# Patient Record
Sex: FEMALE | Race: WHITE | NOT HISPANIC OR LATINO | Employment: UNEMPLOYED | URBAN - METROPOLITAN AREA
[De-identification: names, ages, dates, MRNs, and addresses within clinical notes are randomized per-mention and may not be internally consistent; named-entity substitution may affect disease eponyms.]

---

## 2019-08-12 ENCOUNTER — APPOINTMENT (OUTPATIENT)
Dept: CT IMAGING | Facility: HOSPITAL | Age: 24
End: 2019-08-12

## 2019-08-12 ENCOUNTER — HOSPITAL ENCOUNTER (EMERGENCY)
Facility: HOSPITAL | Age: 24
Discharge: HOME | End: 2019-08-12
Admitting: EMERGENCY MEDICINE

## 2019-08-12 VITALS
DIASTOLIC BLOOD PRESSURE: 67 MMHG | HEIGHT: 67 IN | HEART RATE: 74 BPM | TEMPERATURE: 98.7 F | OXYGEN SATURATION: 99 % | BODY MASS INDEX: 24.26 KG/M2 | WEIGHT: 154.54 LBS | RESPIRATION RATE: 18 BRPM | SYSTOLIC BLOOD PRESSURE: 108 MMHG

## 2019-08-12 DIAGNOSIS — R10.9 RIGHT FLANK PAIN: Primary | ICD-10-CM

## 2019-08-12 DIAGNOSIS — N39.0 ACUTE UTI: ICD-10-CM

## 2019-08-12 LAB
ALBUMIN SERPL-MCNC: 4 G/DL (ref 3.5–4.8)
ALBUMIN/GLOB SERPL: 1.6 G/DL (ref 1–1.7)
ALP SERPL-CCNC: 61 U/L (ref 32–91)
ALT SERPL W P-5'-P-CCNC: 37 U/L (ref 14–54)
ANION GAP SERPL CALCULATED.3IONS-SCNC: 11.8 MMOL/L (ref 5–15)
AST SERPL-CCNC: 23 U/L (ref 15–41)
B-HCG UR QL: NEGATIVE
BACTERIA UR QL AUTO: ABNORMAL /HPF
BASOPHILS # BLD AUTO: 0 10*3/MM3 (ref 0–0.2)
BASOPHILS NFR BLD AUTO: 0.4 % (ref 0–1.5)
BILIRUB SERPL-MCNC: 0.5 MG/DL (ref 0.3–1.2)
BILIRUB UR QL STRIP: NEGATIVE
BUN BLD-MCNC: 12 MG/DL (ref 8–20)
BUN/CREAT SERPL: 17.1 (ref 5.4–26.2)
CALCIUM SPEC-SCNC: 8.9 MG/DL (ref 8.9–10.3)
CHLORIDE SERPL-SCNC: 111 MMOL/L (ref 101–111)
CLARITY UR: ABNORMAL
CO2 SERPL-SCNC: 22 MMOL/L (ref 22–32)
COLOR UR: YELLOW
CREAT BLD-MCNC: 0.7 MG/DL (ref 0.4–1)
DEPRECATED RDW RBC AUTO: 40.3 FL (ref 37–54)
EOSINOPHIL # BLD AUTO: 0.2 10*3/MM3 (ref 0–0.4)
EOSINOPHIL NFR BLD AUTO: 3.9 % (ref 0.3–6.2)
ERYTHROCYTE [DISTWIDTH] IN BLOOD BY AUTOMATED COUNT: 13 % (ref 12.3–15.4)
GFR SERPL CREATININE-BSD FRML MDRD: 102 ML/MIN/1.73
GLOBULIN UR ELPH-MCNC: 2.5 GM/DL (ref 2.5–3.8)
GLUCOSE BLD-MCNC: 89 MG/DL (ref 65–99)
GLUCOSE UR STRIP-MCNC: NEGATIVE MG/DL
HCT VFR BLD AUTO: 40.4 % (ref 34–46.6)
HGB BLD-MCNC: 13.4 G/DL (ref 12–15.9)
HGB UR QL STRIP.AUTO: NEGATIVE
HYALINE CASTS UR QL AUTO: ABNORMAL /LPF
KETONES UR QL STRIP: NEGATIVE
LEUKOCYTE ESTERASE UR QL STRIP.AUTO: ABNORMAL
LYMPHOCYTES # BLD AUTO: 1.3 10*3/MM3 (ref 0.7–3.1)
LYMPHOCYTES NFR BLD AUTO: 23.6 % (ref 19.6–45.3)
MCH RBC QN AUTO: 29.4 PG (ref 26.6–33)
MCHC RBC AUTO-ENTMCNC: 33.1 G/DL (ref 31.5–35.7)
MCV RBC AUTO: 88.9 FL (ref 79–97)
MONOCYTES # BLD AUTO: 0.4 10*3/MM3 (ref 0.1–0.9)
MONOCYTES NFR BLD AUTO: 7.5 % (ref 5–12)
NEUTROPHILS # BLD AUTO: 3.5 10*3/MM3 (ref 1.7–7)
NEUTROPHILS NFR BLD AUTO: 64.6 % (ref 42.7–76)
NITRITE UR QL STRIP: POSITIVE
NRBC BLD AUTO-RTO: 0 /100 WBC (ref 0–0.2)
PH UR STRIP.AUTO: 7 [PH] (ref 5–8)
PLATELET # BLD AUTO: 166 10*3/MM3 (ref 140–450)
PMV BLD AUTO: 9.2 FL (ref 6–12)
POTASSIUM BLD-SCNC: 3.8 MMOL/L (ref 3.6–5.1)
PROT SERPL-MCNC: 6.5 G/DL (ref 6.1–7.9)
PROT UR QL STRIP: NEGATIVE
RBC # BLD AUTO: 4.55 10*6/MM3 (ref 3.77–5.28)
RBC # UR: ABNORMAL /HPF
REF LAB TEST METHOD: ABNORMAL
SODIUM BLD-SCNC: 141 MMOL/L (ref 136–144)
SP GR UR STRIP: 1.02 (ref 1–1.03)
SQUAMOUS #/AREA URNS HPF: ABNORMAL /HPF
UROBILINOGEN UR QL STRIP: ABNORMAL
WBC NRBC COR # BLD: 5.4 10*3/MM3 (ref 3.4–10.8)
WBC UR QL AUTO: ABNORMAL /HPF

## 2019-08-12 PROCEDURE — 96375 TX/PRO/DX INJ NEW DRUG ADDON: CPT

## 2019-08-12 PROCEDURE — 25010000002 KETOROLAC TROMETHAMINE PER 15 MG: Performed by: NURSE PRACTITIONER

## 2019-08-12 PROCEDURE — 87086 URINE CULTURE/COLONY COUNT: CPT | Performed by: NURSE PRACTITIONER

## 2019-08-12 PROCEDURE — 81001 URINALYSIS AUTO W/SCOPE: CPT | Performed by: NURSE PRACTITIONER

## 2019-08-12 PROCEDURE — 74176 CT ABD & PELVIS W/O CONTRAST: CPT

## 2019-08-12 PROCEDURE — 80053 COMPREHEN METABOLIC PANEL: CPT | Performed by: NURSE PRACTITIONER

## 2019-08-12 PROCEDURE — 99283 EMERGENCY DEPT VISIT LOW MDM: CPT

## 2019-08-12 PROCEDURE — 81025 URINE PREGNANCY TEST: CPT | Performed by: NURSE PRACTITIONER

## 2019-08-12 PROCEDURE — 87186 SC STD MICRODIL/AGAR DIL: CPT | Performed by: NURSE PRACTITIONER

## 2019-08-12 PROCEDURE — 96374 THER/PROPH/DIAG INJ IV PUSH: CPT

## 2019-08-12 PROCEDURE — 25010000002 CEFTRIAXONE PER 250 MG: Performed by: NURSE PRACTITIONER

## 2019-08-12 PROCEDURE — 85025 COMPLETE CBC W/AUTO DIFF WBC: CPT | Performed by: NURSE PRACTITIONER

## 2019-08-12 PROCEDURE — 87088 URINE BACTERIA CULTURE: CPT | Performed by: NURSE PRACTITIONER

## 2019-08-12 RX ORDER — CEFDINIR 300 MG/1
300 CAPSULE ORAL 2 TIMES DAILY
Qty: 20 CAPSULE | Refills: 0 | Status: SHIPPED | OUTPATIENT
Start: 2019-08-12 | End: 2019-08-12 | Stop reason: SDUPTHER

## 2019-08-12 RX ORDER — SODIUM CHLORIDE 0.9 % (FLUSH) 0.9 %
10 SYRINGE (ML) INJECTION AS NEEDED
Status: DISCONTINUED | OUTPATIENT
Start: 2019-08-12 | End: 2019-08-12 | Stop reason: HOSPADM

## 2019-08-12 RX ORDER — KETOROLAC TROMETHAMINE 30 MG/ML
30 INJECTION, SOLUTION INTRAMUSCULAR; INTRAVENOUS ONCE
Status: COMPLETED | OUTPATIENT
Start: 2019-08-12 | End: 2019-08-12

## 2019-08-12 RX ORDER — CEFDINIR 300 MG/1
300 CAPSULE ORAL 2 TIMES DAILY
Qty: 14 CAPSULE | Refills: 0 | Status: SHIPPED | OUTPATIENT
Start: 2019-08-12 | End: 2019-08-19

## 2019-08-12 RX ADMIN — CEFTRIAXONE SODIUM 1 G: 10 INJECTION, POWDER, FOR SOLUTION INTRAVENOUS at 10:55

## 2019-08-12 RX ADMIN — KETOROLAC TROMETHAMINE 30 MG: 30 INJECTION, SOLUTION INTRAMUSCULAR at 09:29

## 2019-08-12 RX ADMIN — SODIUM CHLORIDE 500 ML: 900 INJECTION, SOLUTION INTRAVENOUS at 09:29

## 2019-08-12 NOTE — DISCHARGE INSTRUCTIONS
Take antibiotics as prescribed.  Drink plenty of fluids.  Tylenol ibuprofen as needed for pain.  Follow-up with primary care physician.  Return for new or worsening symptoms.

## 2019-08-12 NOTE — ED PROVIDER NOTES
Subjective   Patient is a 24-year-old white female with no significant medical history presents today with complaints of right flank pain.  She states her symptoms started yesterday and become more constant and severe.  She states the pain does radiate into her right lower back.  She denies any radiation into her abdomen.  She denies any dysuria frequency or urgency but states her urine has smelled stronger than usual.  She denies any visible hematuria.  She denies any nausea vomiting diarrhea or constipation.  She denies fever or chills.  Denies any vaginal bleeding or discharge.            Review of Systems   Constitutional: Negative for chills and fever.   Gastrointestinal: Negative for abdominal pain, diarrhea, nausea and vomiting.   Genitourinary: Positive for flank pain. Negative for decreased urine volume, difficulty urinating, dysuria, frequency, hematuria, pelvic pain, urgency, vaginal bleeding and vaginal discharge.   Musculoskeletal: Positive for back pain.   Skin: Negative for rash.       History reviewed. No pertinent past medical history.    Allergies   Allergen Reactions   • Codeine Unknown (See Comments)     Unknown     • Sulfa Antibiotics Unknown (See Comments)     Unknown         Past Surgical History:   Procedure Laterality Date   • APPENDECTOMY         History reviewed. No pertinent family history.    Social History     Socioeconomic History   • Marital status: Single     Spouse name: Not on file   • Number of children: Not on file   • Years of education: Not on file   • Highest education level: Not on file   Tobacco Use   • Smoking status: Never Smoker   Substance and Sexual Activity   • Alcohol use: No     Frequency: Never   • Drug use: No           Objective   Physical Exam   Constitutional: She appears well-developed.   Vital signs and triage nurse note reviewed.  Constitutional: Awake, alert; well-developed and well-nourished. No acute distress is noted.  HEENT: Normocephalic, atraumatic;  pupils are PERRL with intact EOM; oropharynx is pink and moist without exudate or erythema.  No drooling or pooling of oral secretions.  Neck: Supple, full range of motion without pain; no cervical lymphadenopathy. Normal phonation.  Cardiovascular: Regular rate and rhythm, normal S1-S2.  No murmur noted.  Pulmonary: Respiratory effort regular nonlabored, breath sounds clear to auscultation all fields.  Abdomen: Soft, nontender, nondistended with normoactive bowel sounds; no rebound or guarding.  Mildly tender right flank.  No CVAT.  Musculoskeletal: Independent range of motion of all extremities with no palpable tenderness or edema.  Neuro: Alert oriented x3, speech is clear and appropriate, GCS 15.    Skin: Flesh tone, warm, dry, intact; no erythematous or petechial rash or lesion.        Procedures           ED Course      Labs Reviewed   URINALYSIS W/ CULTURE IF INDICATED - Abnormal; Notable for the following components:       Result Value    Appearance, UA Cloudy (*)     Leuk Esterase, UA Moderate (2+) (*)     Nitrite, UA Positive (*)     All other components within normal limits   URINALYSIS, MICROSCOPIC ONLY - Abnormal; Notable for the following components:    RBC, UA 0-2 (*)     WBC, UA 21-30 (*)     Bacteria, UA 3+ (*)     Squamous Epithelial Cells, UA 7-12 (*)     All other components within normal limits   COMPREHENSIVE METABOLIC PANEL - Normal   PREGNANCY, URINE - Normal   CBC WITH AUTO DIFFERENTIAL - Normal   URINE CULTURE   CBC AND DIFFERENTIAL    Narrative:     The following orders were created for panel order CBC & Differential.  Procedure                               Abnormality         Status                     ---------                               -----------         ------                     CBC Auto Differential[[id number]]        Normal              Final result                 Please view results for these tests on the individual orders.     Ct Abdomen Pelvis Without Contrast    Result  Date: 8/12/2019  1. No acute intra-abdominal or pelvic abnormality. 2. Small volume free fluid within the pelvis likely physiologic given patient's age.    Electronically Signed By-Juan Bee On:8/12/2019 10:37 AM This report was finalized on [id number] by  Juan Bee, .    Medications   sodium chloride 0.9 % flush 10 mL (not administered)   cefTRIAXone (ROCEPHIN) in SWFI 1 gram/10ml IV PUSH syringe (not administered)   sodium chloride 0.9 % bolus 500 mL (500 mL Intravenous New Bag 8/12/19 0929)   ketorolac (TORADOL) injection 30 mg (30 mg Intravenous Given 8/12/19 0929)                 MDM  Number of Diagnoses or Management Options     Amount and/or Complexity of Data Reviewed  Clinical lab tests: ordered and reviewed  Tests in the radiology section of CPT®: ordered and reviewed  Independent visualization of images, tracings, or specimens: yes    Risk of Complications, Morbidity, and/or Mortality  General comments: Comorbidities: None  Differentials: UTI, pyelonephritis, kidney stone;this list is not all inclusive and does not constitute the entirety of considered causes    Patient had IV established.  She had labs and CT abdomen pelvis obtained.  She is given a small fluid bolus as well as Toradol for pain.    On reexamination, patient's resting comfortably in no acute distress.  Denies any new complaints at this time.  She remains well-appearing.  Her vital signs are stable.    Patient is given Rocephin 1 g IV for urinary tract infection.    Diagnosis and treatment plan discussed with patient.  Patient agreeable to plan.   I discussed findings with patient who voices understanding of discharge instructions, signs and symptoms requiring return to ED; discharged improved and in stable condition with follow up for re-evaluation.  Prescription for Cefdinir.    Patient Progress  Patient progress: stable        Final diagnoses:   Right flank pain   Acute UTI            Rashmi Villatoro, JEAN-PAUL  08/12/19  1042

## 2019-08-12 NOTE — ED NOTES
Pt states that she has abd pain since yesterday. Pt is having right sided pain.     Dannie Gibbons, LPN  08/12/19 0996

## 2019-08-14 LAB — BACTERIA SPEC AEROBE CULT: ABNORMAL

## 2019-08-14 NOTE — PROGRESS NOTES
Notes recorded by Waldo Moore, PharmD on 8/14/2019 at 4:11 PM EDT  Final C/S resulted. Cefdinir appropriate for treatment. No follow up therapy intervention needed.   Waldo Moore, PharmD    ------    Notes recorded by Iron Arboleda, PharmD on 8/13/2019 at 1:56 PM EDT  8/12 urine cx growing 100K E.coli, Cefdinir given at ED d/c. Pending final C&S.  Iron Arboleda, DeannaD

## 2020-07-25 ENCOUNTER — HOSPITAL ENCOUNTER (EMERGENCY)
Facility: HOSPITAL | Age: 25
Discharge: HOME | End: 2020-07-25
Admitting: EMERGENCY MEDICINE

## 2020-07-25 VITALS
TEMPERATURE: 98 F | RESPIRATION RATE: 16 BRPM | OXYGEN SATURATION: 98 % | HEIGHT: 67 IN | HEART RATE: 70 BPM | BODY MASS INDEX: 26.33 KG/M2 | WEIGHT: 167.77 LBS | SYSTOLIC BLOOD PRESSURE: 126 MMHG | DIASTOLIC BLOOD PRESSURE: 84 MMHG

## 2020-07-25 DIAGNOSIS — Z71.1 WORRIED WELL: Primary | ICD-10-CM
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
## 2020-07-25 PROCEDURE — 99282 EMERGENCY DEPT VISIT SF MDM: CPT

## 2020-07-26 NOTE — DISCHARGE INSTRUCTIONS
Monitor for symptoms.  Follow-up with your OB/GYN as needed.  Return for pain, bleeding, new or worsening symptoms.

## 2020-07-26 NOTE — ED PROVIDER NOTES
"Subjective   Patient is a 25-year-old white female with no significant medical history who presents today with a worried well visit.  She states she was running to get to her toddler who was outside.  She states she tripped and fell and landed flat on her stomach.  She states she was able to get up after the fall.  She states she had a laparoscopic tubal ligation performed yesterday and given history of trauma today is worried.  She denies any abdominal pain.  She denies any vaginal bleeding or discharge.  She denies any fever.  She denies striking her head or having LOC.  She denies any other injury or complaint.  She states \"I just wanted to get it checked out.\"          Review of Systems   Constitutional: Negative for fever.   Respiratory: Negative for shortness of breath.    Cardiovascular: Negative for chest pain.   Gastrointestinal: Negative for abdominal pain, diarrhea, nausea and vomiting.   Genitourinary: Negative for pelvic pain, vaginal bleeding and vaginal discharge.       History reviewed. No pertinent past medical history.    Allergies   Allergen Reactions   • Codeine Unknown (See Comments)     Unknown     • Sulfa Antibiotics Unknown (See Comments)     Unknown         Past Surgical History:   Procedure Laterality Date   • APPENDECTOMY         History reviewed. No pertinent family history.    Social History     Socioeconomic History   • Marital status: Single     Spouse name: Not on file   • Number of children: Not on file   • Years of education: Not on file   • Highest education level: Not on file   Tobacco Use   • Smoking status: Never Smoker   Substance and Sexual Activity   • Alcohol use: No     Frequency: Never   • Drug use: No   • Sexual activity: Defer           Objective   Physical Exam  Vital signs and triage nurse note reviewed.  Constitutional: Awake, alert; well-developed and well-nourished. No acute distress is noted.  HEENT: Normocephalic, atraumatic; pupils are PERRL with intact EOM; " oropharynx is pink and moist without exudate or erythema.  No drooling or pooling of oral secretions.  Neck: Supple, full range of motion without pain; no cervical lymphadenopathy. Normal phonation.  Cardiovascular: Regular rate and rhythm, normal S1-S2.  No murmur noted.  Pulmonary: Respiratory effort regular nonlabored, breath sounds clear to auscultation all fields.  Abdomen: Soft, nontender, nondistended with normoactive bowel sounds; no rebound or guarding.  There are well approximated small surgical incisions noted over the abdomen.  There is no signs of infection.  No erythema.  No ecchymosis.  No drainage from these areas.  Musculoskeletal: Independent range of motion of all extremities with no palpable tenderness or edema.  Neuro: Alert oriented x3, speech is clear and appropriate, GCS 15.    Skin: Flesh tone, warm, dry, intact; no erythematous or petechial rash or lesion.      Procedures           ED Course                                           MDM  Number of Diagnoses or Management Options  Worried well:   Diagnosis management comments: Diagnosis and treatment plan discussed with patient.  Patient agreeable to plan.   I discussed findings with patient who voices understanding of discharge instructions, signs and symptoms requiring return to ED; discharged improved and in stable condition with follow up for re-evaluation.      Patient Progress  Patient progress: stable      Final diagnoses:   Worried well   Fall, initial encounter            Rashmi Villatoro APRN  07/25/20 2300       Rashmi Villatoro APRN  07/25/20 2300

## 2020-11-09 ENCOUNTER — HOSPITAL ENCOUNTER (EMERGENCY)
Facility: HOSPITAL | Age: 25
Discharge: HOME | End: 2020-11-09
Attending: EMERGENCY MEDICINE | Admitting: EMERGENCY MEDICINE

## 2020-11-09 VITALS
WEIGHT: 158.73 LBS | HEART RATE: 78 BPM | DIASTOLIC BLOOD PRESSURE: 87 MMHG | SYSTOLIC BLOOD PRESSURE: 124 MMHG | HEIGHT: 67 IN | BODY MASS INDEX: 24.91 KG/M2 | OXYGEN SATURATION: 100 % | TEMPERATURE: 98.7 F | RESPIRATION RATE: 18 BRPM

## 2020-11-09 DIAGNOSIS — U07.1 COVID-19: ICD-10-CM

## 2020-11-09 DIAGNOSIS — M54.31 SCIATICA OF RIGHT SIDE: Primary | ICD-10-CM

## 2020-11-09 LAB — SARS-COV-2 RNA PNL SPEC NAA+PROBE: DETECTED

## 2020-11-09 PROCEDURE — 87635 SARS-COV-2 COVID-19 AMP PRB: CPT | Performed by: EMERGENCY MEDICINE

## 2020-11-09 PROCEDURE — 99283 EMERGENCY DEPT VISIT LOW MDM: CPT

## 2020-11-09 PROCEDURE — C9803 HOPD COVID-19 SPEC COLLECT: HCPCS

## 2020-11-09 RX ORDER — MELOXICAM 15 MG/1
15 TABLET ORAL DAILY
Qty: 10 TABLET | Refills: 0 | Status: SHIPPED | OUTPATIENT
Start: 2020-11-09

## 2020-11-10 ENCOUNTER — EPISODE CHANGES (OUTPATIENT)
Dept: CASE MANAGEMENT | Facility: OTHER | Age: 25
End: 2020-11-10

## 2020-11-10 ENCOUNTER — PATIENT OUTREACH (OUTPATIENT)
Dept: CASE MANAGEMENT | Facility: OTHER | Age: 25
End: 2020-11-10

## 2020-11-10 NOTE — ED NOTES
Pt reports having a pain in her right buttock, coarsing down her right posterior leg to the top of the knee. Pain started 6 days ago when she was lifting her baby into a truck. States the pain is similar to the sciatica that she had while pregnant. Pt also reports an occasional cough, but consistent headache for the past few days. Reports exposure to covid 10 days ago.      Ingrid Webber, RN  11/09/20 1928

## 2020-11-10 NOTE — ED PROVIDER NOTES
Subjective   History of Present Illness  25-year-old female presents with lower back pain.  She describes that this is right buttock with some ration down right leg.  She had lifted a baby seat into truck prior to the onset of pain.  She has had no fever.  She has had some minimal cough.  She has had some sore throat headache.  She has had Covid exposure.  She reports no vomiting no diarrhea.  She does not have any shortness of breath.  She is having no abdominal pain bowel or bladder complaints.  Review of Systems  Negative for fever shortness of breath abdominal pain bowel or bladder complaints  History reviewed. No pertinent past medical history.  Tubal ligation  Allergies   Allergen Reactions   • Codeine Unknown (See Comments)     Unknown     • Sulfa Antibiotics Unknown (See Comments)     Unknown         Past Surgical History:   Procedure Laterality Date   • APPENDECTOMY     • TUBAL ABDOMINAL LIGATION         History reviewed. No pertinent family history.    Social History     Socioeconomic History   • Marital status: Single     Spouse name: Not on file   • Number of children: Not on file   • Years of education: Not on file   • Highest education level: Not on file   Tobacco Use   • Smoking status: Never Smoker   Substance and Sexual Activity   • Alcohol use: No     Frequency: Never   • Drug use: No   • Sexual activity: Defer   No routine medications        Objective   Physical Exam  25-year-old female awake alert.  Generally well-developed well-nourished.  Pupils equal round reactive to light.  Oropharynx clear neck supple chest clear equal breath sounds cardiovascular regular rate and rhythm abdomen soft nontender she complains of pain in the right SI region.  She has some ration down right leg.  She has negative straight leg raise.  Reflexes are 2+ brisk and symmetric knees and ankles motor sensory intact without deficit.  Skin without rash noted.  Procedures           ED Course      Results for orders placed  "or performed during the hospital encounter of 11/09/20   COVID-19, [facility] IN-HOUSE,NP Swab (NO TRANSPORT MEDIA) 2 HR TAT - Swab, Nasopharynx    Specimen: Nasopharynx; Swab   Result Value Ref Range    COVID19 Detected (C) Not Detected - Ref. Range     No radiology results for the last day  Medications - No data to display  /87   Pulse 78   Temp 98.7 °F (37.1 °C) (Oral)   Resp 18   Ht 170.2 cm (67\")   Wt 72 kg (158 lb 11.7 oz)   SpO2 100%   BMI 24.86 kg/m²                                        MDM  Patient's findings were discussed with her.  She will have Covid swab.  She was given prescription for Mobic.  Advised she can use heat to sore.  Return new or worsening symptoms  Final diagnoses:   Sciatica of right side   COVID-19            Karsten Barreto MD  11/09/20 1942    "

## 2020-11-10 NOTE — OUTREACH NOTE
ED Potential Covid Discharge Follow-up  Talked with patient. Discussed 11/9/20 ED visit regarding DX covid positive.  Patient states to have received COVID 19 rapid test results in ED  Patient report sx of HA and nasal congestion continue, nasal drainage continues to be clear. Denies fever/chills.  No c/o chest pressure; SOA; N/V, D/C,  appetite or sleeping problems. Is eating/drinking well.  Patient reports has friends/family that can help obtain food; medication, transportation.  Patient states received appropriate work excuse in ED  Patient lives with her children and fiance - states they have no symptoms and are quarantining with her .  Reviewed with patient: ED recommendations; quarantine education; COVID 19 precautions; 24/7 Nurse Triage Line;  ACM contact info. Pt declined covid hotline #.  Patient states she doesn't have a PCP yet, but is considering JEAN-PAUL Vargas in [place].  She did not request scheduling assist at this time, but was advised to call RN-ACM should there be any problem scheduling.  She verb understanding.  Patient states to appreciate phone call. No further questions or concerns voiced at this time.     Wendy Gonzalez RN  Ambulatory     11/10/2020, 17:16 EST

## 2021-06-16 ENCOUNTER — HOSPITAL ENCOUNTER (EMERGENCY)
Facility: HOSPITAL | Age: 26
Discharge: HOME | End: 2021-06-17
Admitting: EMERGENCY MEDICINE

## 2021-06-16 ENCOUNTER — APPOINTMENT (OUTPATIENT)
Dept: CT IMAGING | Facility: HOSPITAL | Age: 26
End: 2021-06-16

## 2021-06-16 DIAGNOSIS — R10.11 RIGHT UPPER QUADRANT ABDOMINAL PAIN: ICD-10-CM

## 2021-06-16 DIAGNOSIS — N12 PYELONEPHRITIS: Primary | ICD-10-CM

## 2021-06-16 LAB
ALBUMIN SERPL-MCNC: 4.1 G/DL (ref 3.5–5.2)
ALBUMIN/GLOB SERPL: 1.6 G/DL
ALP SERPL-CCNC: 73 U/L (ref 39–117)
ALT SERPL W P-5'-P-CCNC: 8 U/L (ref 1–33)
ANION GAP SERPL CALCULATED.3IONS-SCNC: 8 MMOL/L (ref 5–15)
AST SERPL-CCNC: 10 U/L (ref 1–32)
B-HCG UR QL: NEGATIVE
BASOPHILS # BLD AUTO: 0 10*3/MM3 (ref 0–0.2)
BASOPHILS NFR BLD AUTO: 0.5 % (ref 0–1.5)
BILIRUB SERPL-MCNC: 0.3 MG/DL (ref 0–1.2)
BILIRUB UR QL STRIP: NEGATIVE
BUN SERPL-MCNC: 14 MG/DL (ref 6–20)
BUN/CREAT SERPL: 20 (ref 7–25)
CALCIUM SPEC-SCNC: 8.9 MG/DL (ref 8.6–10.5)
CHLORIDE SERPL-SCNC: 104 MMOL/L (ref 98–107)
CLARITY UR: CLEAR
CO2 SERPL-SCNC: 26 MMOL/L (ref 22–29)
COLOR UR: YELLOW
CREAT SERPL-MCNC: 0.7 MG/DL (ref 0.57–1)
DEPRECATED RDW RBC AUTO: 38.9 FL (ref 37–54)
EOSINOPHIL # BLD AUTO: 0.1 10*3/MM3 (ref 0–0.4)
EOSINOPHIL NFR BLD AUTO: 0.8 % (ref 0.3–6.2)
ERYTHROCYTE [DISTWIDTH] IN BLOOD BY AUTOMATED COUNT: 12.4 % (ref 12.3–15.4)
GFR SERPL CREATININE-BSD FRML MDRD: 101 ML/MIN/1.73
GLOBULIN UR ELPH-MCNC: 2.5 GM/DL
GLUCOSE SERPL-MCNC: 94 MG/DL (ref 65–99)
GLUCOSE UR STRIP-MCNC: NEGATIVE MG/DL
HCT VFR BLD AUTO: 35.2 % (ref 34–46.6)
HGB BLD-MCNC: 12.4 G/DL (ref 12–15.9)
HGB UR QL STRIP.AUTO: NEGATIVE
HOLD SPECIMEN: NORMAL
KETONES UR QL STRIP: NEGATIVE
LEUKOCYTE ESTERASE UR QL STRIP.AUTO: NEGATIVE
LIPASE SERPL-CCNC: 29 U/L (ref 13–60)
LYMPHOCYTES # BLD AUTO: 1.4 10*3/MM3 (ref 0.7–3.1)
LYMPHOCYTES NFR BLD AUTO: 15.7 % (ref 19.6–45.3)
MCH RBC QN AUTO: 31.7 PG (ref 26.6–33)
MCHC RBC AUTO-ENTMCNC: 35.2 G/DL (ref 31.5–35.7)
MCV RBC AUTO: 90 FL (ref 79–97)
MONOCYTES # BLD AUTO: 0.8 10*3/MM3 (ref 0.1–0.9)
MONOCYTES NFR BLD AUTO: 9 % (ref 5–12)
NEUTROPHILS NFR BLD AUTO: 6.7 10*3/MM3 (ref 1.7–7)
NEUTROPHILS NFR BLD AUTO: 74 % (ref 42.7–76)
NITRITE UR QL STRIP: NEGATIVE
NRBC BLD AUTO-RTO: 0 /100 WBC (ref 0–0.2)
PH UR STRIP.AUTO: 8 [PH] (ref 5–8)
PLATELET # BLD AUTO: 136 10*3/MM3 (ref 140–450)
PMV BLD AUTO: 8.8 FL (ref 6–12)
POTASSIUM SERPL-SCNC: 4.2 MMOL/L (ref 3.5–5.2)
PROT SERPL-MCNC: 6.6 G/DL (ref 6–8.5)
PROT UR QL STRIP: NEGATIVE
RBC # BLD AUTO: 3.91 10*6/MM3 (ref 3.77–5.28)
SODIUM SERPL-SCNC: 138 MMOL/L (ref 136–145)
SP GR UR STRIP: 1.02 (ref 1–1.03)
UROBILINOGEN UR QL STRIP: NORMAL
WBC # BLD AUTO: 9 10*3/MM3 (ref 3.4–10.8)

## 2021-06-16 PROCEDURE — 83690 ASSAY OF LIPASE: CPT | Performed by: PHYSICIAN ASSISTANT

## 2021-06-16 PROCEDURE — 85025 COMPLETE CBC W/AUTO DIFF WBC: CPT | Performed by: PHYSICIAN ASSISTANT

## 2021-06-16 PROCEDURE — 80053 COMPREHEN METABOLIC PANEL: CPT | Performed by: PHYSICIAN ASSISTANT

## 2021-06-16 PROCEDURE — 81025 URINE PREGNANCY TEST: CPT | Performed by: PHYSICIAN ASSISTANT

## 2021-06-16 PROCEDURE — 74176 CT ABD & PELVIS W/O CONTRAST: CPT

## 2021-06-16 PROCEDURE — 81003 URINALYSIS AUTO W/O SCOPE: CPT | Performed by: PHYSICIAN ASSISTANT

## 2021-06-16 PROCEDURE — 99283 EMERGENCY DEPT VISIT LOW MDM: CPT

## 2021-06-16 RX ORDER — SODIUM CHLORIDE 0.9 % (FLUSH) 0.9 %
10 SYRINGE (ML) INJECTION AS NEEDED
Status: DISCONTINUED | OUTPATIENT
Start: 2021-06-16 | End: 2021-06-17 | Stop reason: HOSPADM

## 2021-06-17 VITALS
DIASTOLIC BLOOD PRESSURE: 80 MMHG | SYSTOLIC BLOOD PRESSURE: 122 MMHG | RESPIRATION RATE: 18 BRPM | OXYGEN SATURATION: 96 % | HEIGHT: 67 IN | TEMPERATURE: 98.4 F | BODY MASS INDEX: 23.49 KG/M2 | HEART RATE: 89 BPM | WEIGHT: 149.69 LBS

## 2021-06-17 PROCEDURE — 25010000002 KETOROLAC TROMETHAMINE PER 15 MG: Performed by: PHYSICIAN ASSISTANT

## 2021-06-17 PROCEDURE — 96375 TX/PRO/DX INJ NEW DRUG ADDON: CPT

## 2021-06-17 PROCEDURE — 25010000002 CEFTRIAXONE IN SWFI 1 GRAM/10ML IV PUSH SYRINGE (SIMPLE): Performed by: PHYSICIAN ASSISTANT

## 2021-06-17 PROCEDURE — 96374 THER/PROPH/DIAG INJ IV PUSH: CPT

## 2021-06-17 RX ORDER — CEFPODOXIME PROXETIL 200 MG/1
200 TABLET, FILM COATED ORAL EVERY 12 HOURS
Qty: 20 TABLET | Refills: 0 | Status: SHIPPED | OUTPATIENT
Start: 2021-06-17

## 2021-06-17 RX ORDER — ONDANSETRON 4 MG/1
4 TABLET, FILM COATED ORAL EVERY 6 HOURS PRN
Qty: 30 TABLET | Refills: 0 | Status: SHIPPED | OUTPATIENT
Start: 2021-06-17

## 2021-06-17 RX ORDER — KETOROLAC TROMETHAMINE 30 MG/ML
30 INJECTION, SOLUTION INTRAMUSCULAR; INTRAVENOUS ONCE
Status: COMPLETED | OUTPATIENT
Start: 2021-06-17 | End: 2021-06-17

## 2021-06-17 RX ADMIN — KETOROLAC TROMETHAMINE 30 MG: 30 INJECTION, SOLUTION INTRAMUSCULAR at 00:55

## 2021-06-17 RX ADMIN — CEFTRIAXONE SODIUM 1 G: 1 INJECTION, POWDER, FOR SOLUTION INTRAMUSCULAR; INTRAVENOUS at 00:54

## 2021-06-17 NOTE — ED PROVIDER NOTES
Subjective     Patient is a 25-year-old female comes in complaining of abdominal pain for the past 2 days.  Patient states that the pain initially started on her right flank but has now migrated to her right upper quadrant.  Patient reports some nausea today but otherwise denies any vomiting, diarrhea, cough, fever, chills, chest pain, shortness of breath, swelling in her legs bilaterally, vaginal bleeding, vaginal discharge or any concern for STDs.  Patient reports history of tubal ligation.  Patient states nothing seems to make the pain better or worse.  Patient states the pain is about a 6 out of 10 that is constant.      Review of Systems   Constitutional: Negative for chills, fatigue and fever.   HENT: Negative for congestion, sore throat, tinnitus and trouble swallowing.    Eyes: Negative for photophobia, discharge and visual disturbance.   Respiratory: Negative for cough and shortness of breath.    Cardiovascular: Negative for chest pain, palpitations and leg swelling.   Gastrointestinal: Positive for abdominal pain (ruq) and nausea. Negative for diarrhea and vomiting.   Genitourinary: Negative for dysuria, flank pain and urgency.   Musculoskeletal: Negative for arthralgias and myalgias.   Skin: Negative for rash.   Neurological: Negative for dizziness and headaches.   Psychiatric/Behavioral: Negative for confusion.       History reviewed. No pertinent past medical history.    Allergies   Allergen Reactions   • Codeine Unknown (See Comments)     Unknown     • Sulfa Antibiotics Unknown (See Comments)     Unknown         Past Surgical History:   Procedure Laterality Date   • APPENDECTOMY     • TUBAL ABDOMINAL LIGATION         History reviewed. No pertinent family history.    Social History     Socioeconomic History   • Marital status: Single     Spouse name: Not on file   • Number of children: Not on file   • Years of education: Not on file   • Highest education level: Not on file   Tobacco Use   • Smoking  status: Never Smoker   Substance and Sexual Activity   • Alcohol use: No   • Drug use: No   • Sexual activity: Defer           Objective   Physical Exam  Vitals and nursing note reviewed.   Constitutional:       General: She is not in acute distress.     Appearance: She is well-developed. She is not diaphoretic.   HENT:      Head: Normocephalic and atraumatic.      Nose: Nose normal.      Mouth/Throat:      Pharynx: No oropharyngeal exudate.   Eyes:      Extraocular Movements: Extraocular movements intact.      Conjunctiva/sclera: Conjunctivae normal.      Pupils: Pupils are equal, round, and reactive to light.   Cardiovascular:      Rate and Rhythm: Normal rate and regular rhythm.      Heart sounds: Normal heart sounds.      Comments: S1, S2 audible.  Pulmonary:      Effort: Pulmonary effort is normal. No respiratory distress.      Breath sounds: Normal breath sounds. No wheezing.      Comments: On room air.  Abdominal:      General: Bowel sounds are normal. There is no distension.      Palpations: Abdomen is soft.      Tenderness: There is abdominal tenderness (mild) in the right upper quadrant. There is no right CVA tenderness, left CVA tenderness, guarding or rebound. Negative signs include Javier's sign, Rovsing's sign, McBurney's sign, psoas sign and obturator sign.   Musculoskeletal:         General: No tenderness or deformity. Normal range of motion.      Cervical back: Normal range of motion.   Skin:     General: Skin is warm.      Capillary Refill: Capillary refill takes less than 2 seconds.      Findings: No erythema or rash.   Neurological:      Mental Status: She is alert and oriented to person, place, and time.      Cranial Nerves: No cranial nerve deficit.   Psychiatric:         Mood and Affect: Mood normal.         Behavior: Behavior normal.         Procedures           ED Course  ED Course as of Jun 17 0050   Thu Jun 17, 2021   0037 Spoke with Dr. Flynn with neurology who recommended to treat  "patient for pyelonephritis with Rocephin for now.  And have the patient follow-up with urology in 1 week in the office.    [RL]      ED Course User Index  [RL] Hari Sharma PA      /84 (Patient Position: Sitting)   Pulse 99   Temp 98.4 °F (36.9 °C) (Oral)   Resp 17   Ht 170.2 cm (67\")   Wt 67.9 kg (149 lb 11.1 oz)   SpO2 94%   BMI 23.45 kg/m²   Labs Reviewed   CBC WITH AUTO DIFFERENTIAL - Abnormal; Notable for the following components:       Result Value    Platelets 136 (*)     Lymphocyte % 15.7 (*)     All other components within normal limits   LIPASE - Normal   URINALYSIS W/ CULTURE IF INDICATED - Normal    Narrative:     Urine microscopic not indicated.   PREGNANCY, URINE - Normal   COMPREHENSIVE METABOLIC PANEL    Narrative:     GFR Normal >60  Chronic Kidney Disease <60  Kidney Failure <15     CBC AND DIFFERENTIAL    Narrative:     The following orders were created for panel order CBC & Differential.  Procedure                               Abnormality         Status                     ---------                               -----------         ------                     CBC Auto Differential[[id number]]        Abnormal            Final result                 Please view results for these tests on the individual orders.   EXTRA TUBES    Narrative:     The following orders were created for panel order Extra Tubes.  Procedure                               Abnormality         Status                     ---------                               -----------         ------                     Gold Top - SST[[id number]]                                   Final result                 Please view results for these tests on the individual orders.   GOLD TOP - SST     CT Abdomen Pelvis Stone Protocol   Final Result   1. There is subtle right-sided perinephric edema. This is suspicious for an ascending urinary tract infection. There is no nephrolithiasis or evidence of urinary obstruction.   2. Status post " appendectomy.      Electronically signed by:  Hipolito Rich M.D.     6/16/2021 10:05 PM                                             [facility]     Chart review: Patient has a history of tubal ligation, patient is not breast-feeding   EKG:    Imaging: CT abdomen pelvis stone protocol shows there is subtle right-sided perinephric edema.  This is suspicious for an ascending UTI.  There is no nephrolithiasis or evidence of urinary obstruction.  Status post appendectomy.  Labs: UA unremarkable, CMP unremarkable, urine hCG negative, lipase normal.  White blood cell count normal, platelets slightly low at 136 but last was 160s and patient appears to run low chronically with this.    Medications given:    Medications   sodium chloride 0.9 % flush 10 mL (has no administration in time range)   ketorolac (TORADOL) injection 30 mg (has no administration in time range)   cefTRIAXone (ROCEPHIN) in SWFI 1 gram/10ml IV PUSH syringe (has no administration in time range)     Procedures:    MDM: Patient is a 25-year-old female comes in complaining of abdominal pain.  CT scan correlates with possible pyelonephritis.  Urology was consulted because UA and lab work was otherwise unremarkable.  Urology, Dr. Flynn, recommended treating patient for pyelonephritis and as long as patient can tolerate p.o. can follow-up in 1 week's time with urology in the office. See full discharge instructions for further details.  Plan discussed with patient and is agreeable with plan.      Final diagnoses:   Pyelonephritis   Right upper quadrant abdominal pain       ED Disposition  ED Disposition     ED Disposition Condition Comment    Discharge Stable           [facility]  [address] [facility] [address] [phone]  Go in 1 day  If symptoms worsen    Colin Kunz MD  [address] [place] [address] [phone]    Call in 1 week  for follow up         Medication List      New Prescriptions     cefpodoxime 200 MG tablet  Commonly known as: VANTIN  Take 1 tablet by mouth Every 12 (Twelve) Hours.     ondansetron 4 MG tablet  Commonly known as: ZOFRAN  Take 1 tablet by mouth Every 6 (Six) Hours As Needed for Nausea or Vomiting.           Where to Get Your Medications      These medications were sent to [place], [place] - [address] [place] [address], [address] - [phone]  - [phone] [place] [address], [place] [place] [address] [place] [address]    Phone: [phone]   · cefpodoxime 200 MG tablet  · ondansetron 4 MG tablet          Hari Sharma PA  06/17/21 0050

## 2024-12-02 NOTE — ED NOTES
No large portions of beef, milk, eggs or cheese   Tech transport requested to CT rm 3     Tonja Putnam  08/12/19 4640